# Patient Record
Sex: MALE | Race: WHITE | NOT HISPANIC OR LATINO | Employment: FULL TIME | URBAN - METROPOLITAN AREA
[De-identification: names, ages, dates, MRNs, and addresses within clinical notes are randomized per-mention and may not be internally consistent; named-entity substitution may affect disease eponyms.]

---

## 2019-04-04 ENCOUNTER — TELEPHONE (OUTPATIENT)
Dept: HEMATOLOGY/ONCOLOGY | Facility: CLINIC | Age: 74
End: 2019-04-04

## 2019-04-04 NOTE — NURSING
Nurse Navigator Note:     Spoke with patient discussed my role as his Nurse Navigator to help provide assistance with care coordination. Patient was given my direct contact information and encouraged to call me if he has any questions or concerns. He verbalized understanding

## 2019-04-10 ENCOUNTER — INITIAL CONSULT (OUTPATIENT)
Dept: HEMATOLOGY/ONCOLOGY | Facility: CLINIC | Age: 74
End: 2019-04-10
Payer: COMMERCIAL

## 2019-04-10 VITALS
DIASTOLIC BLOOD PRESSURE: 67 MMHG | RESPIRATION RATE: 16 BRPM | HEART RATE: 96 BPM | BODY MASS INDEX: 21.48 KG/M2 | TEMPERATURE: 98 F | HEIGHT: 71 IN | SYSTOLIC BLOOD PRESSURE: 120 MMHG | WEIGHT: 153.44 LBS

## 2019-04-10 DIAGNOSIS — C64.9 RENAL CELL CARCINOMA, UNSPECIFIED LATERALITY: Primary | ICD-10-CM

## 2019-04-10 PROCEDURE — 99999 PR PBB SHADOW E&M-EST. PATIENT-LVL III: CPT | Mod: PBBFAC,,, | Performed by: INTERNAL MEDICINE

## 2019-04-10 PROCEDURE — 99999 PR PBB SHADOW E&M-EST. PATIENT-LVL III: ICD-10-PCS | Mod: PBBFAC,,, | Performed by: INTERNAL MEDICINE

## 2019-04-10 PROCEDURE — 99205 OFFICE O/P NEW HI 60 MIN: CPT | Mod: S$GLB,,, | Performed by: INTERNAL MEDICINE

## 2019-04-10 PROCEDURE — 1101F PT FALLS ASSESS-DOCD LE1/YR: CPT | Mod: CPTII,S$GLB,, | Performed by: INTERNAL MEDICINE

## 2019-04-10 PROCEDURE — 99205 PR OFFICE/OUTPT VISIT, NEW, LEVL V, 60-74 MIN: ICD-10-PCS | Mod: S$GLB,,, | Performed by: INTERNAL MEDICINE

## 2019-04-10 PROCEDURE — 1101F PR PT FALLS ASSESS DOC 0-1 FALLS W/OUT INJ PAST YR: ICD-10-PCS | Mod: CPTII,S$GLB,, | Performed by: INTERNAL MEDICINE

## 2019-04-10 NOTE — PROGRESS NOTES
Subjective:       Patient ID: Vj Maxwell is a 73 y.o. male.    Chief Complaint: RCC/ Strata (consult)    Patient is a pleasant 72yo WM with PMHx of metastatic RCC, HTN, HLD, TIA, hypothyroidism, and CKD3 who presents for initial consultation and evaluation for STRATA. Patient is followed by Dr. Monet at [facility] and recently started combination immunotherapy with ipi/nivo ~2 weeks prior. Patient's history dates back to  when he was first diagnosed with RCC and underwent a R nephrectomy. He has underwent several lines of systemic therapy as outlined below. He most recently progressed on lenvatinib plus everolimus, which he tolerated very poorly. He has debilitating diarrhea, n/v, and FTT with a ~50lb weight loss. He self discontinued lenvatinib plus everolimus in 2019; restaging scans 2019 showed PD. Since discontinuing lenvatinib plus everolimus, patient has been feeling significantly improved. He reports improved appetite/po intake resulting in a ~20lb weight gain. He has had resolution of diarrhea and fatigue. He started ipi/nivo ~2 weeks ago and denies any significant adverse effects from his new therapy. Today he offers no complaints. Patient denies chest pains, palpitations, SOB, n/v, abdo pain, constipation/diarrhea, dysuria. No other issues.     ECO    Oncologic History:  Patient was diagnosed in  with RCC and underwent a right nephrectomy. He had subsequent disease and underwent treatment with Sutent, Nexavar, Inlyta, Votrient, and Opdivo. He was evaluated at [facility] and underwent a left adrenalectomy in . In 2018, patient was switched from Votrient to lenvatinib plus evirolimus, which he tolerated poorly due to diarrhea, n/v, and FTT. Patient had PD noted on CT 3/13/19 and switched to combination immunotherapy with ipi/nivo in late 2019. [place] [facility] [place]    Review of Systems   Constitutional: Negative for chills, fatigue, fever and unexpected weight change.  "  HENT: Negative for sore throat and trouble swallowing.    Respiratory: Negative for cough and shortness of breath.    Cardiovascular: Negative for chest pain and palpitations.   Gastrointestinal: Negative for abdominal pain, diarrhea, nausea and vomiting.   Genitourinary: Negative for dysuria and hematuria.   Musculoskeletal: Negative for arthralgias and myalgias.   Skin: Negative for rash and wound.   Neurological: Negative for seizures and syncope.   Hematological: Negative for adenopathy. Does not bruise/bleed easily.   Psychiatric/Behavioral: Negative for agitation and confusion.       Allergies:  Review of patient's allergies indicates:  No Known Allergies    Medications:  Current Outpatient Medications   Medication Sig Dispense Refill    amlodipine (NORVASC) 5 MG tablet Take 5 mg by mouth once daily.      aspirin (ECOTRIN) 325 MG EC tablet Take 325 mg by mouth once daily.      fenofibrate micronized (LOFIBRA) 134 MG Cap Take 145 mg by mouth daily with breakfast.      levothyroxine (SYNTHROID) 125 MCG tablet Take 137 mcg by mouth once daily.      pravastatin (PRAVACHOL) 40 MG tablet Take 40 mg by mouth once daily.      dabigatran etexilate (PRADAXA) 150 mg Cap Take 150 mg by mouth once daily. "Do NOT break, chew, or open capsules."      hydrocodone-acetaminophen 5-325mg (NORCO) 5-325 mg per tablet Take 1 tablet by mouth every 6 (six) hours as needed. 30 tablet 0     No current facility-administered medications for this visit.        PMH:  Past Medical History:   Diagnosis Date    Anticoagulant long-term use     Pradaxa and Aspirin    Cancer     cancer Kidney Rt    Hypertension     on medication    Stroke 03/2016    Thyroid disease     on medication       PSH:  Past Surgical History:   Procedure Laterality Date    APPENDECTOMY      during gall bladder surgery    CHOLECYSTECTOMY  1985    FINGER AMPUTATION  2011    Lt index finger    PFREMPYLI-OABE-T-CATH (C-ARM) Left 6/2/2016    Performed by " [facility]    KNEE ARTHROSCOPY      bilateral    NEPHRECTOMY  2012    Rt kidney       FamHx:  Family History   Problem Relation Age of Onset    Cancer Father     Cancer Brother        SocHx:  Social History     Socioeconomic History    Marital status:      Spouse name: Not on file    Number of children: Not on file    Years of education: Not on file    Highest education level: Not on file   Occupational History    Not on file   Social Needs    Financial resource strain: Not on file    Food insecurity:     Worry: Not on file     Inability: Not on file    Transportation needs:     Medical: Not on file     Non-medical: Not on file   Tobacco Use    Smoking status: Former Smoker     Last attempt to quit:      Years since quittin.2    Smokeless tobacco: Never Used   Substance and Sexual Activity    Alcohol use: Yes     Comment: social    Drug use: No    Sexual activity: Yes     Partners: Female   Lifestyle    Physical activity:     Days per week: Not on file     Minutes per session: Not on file    Stress: Not on file   Relationships    Social connections:     Talks on phone: Not on file     Gets together: Not on file     Attends Worship service: Not on file     Active member of club or organization: Not on file     Attends meetings of clubs or organizations: Not on file     Relationship status: Not on file   Other Topics Concern    Not on file   Social History Narrative    Not on file       Distress Score    Distress Score: 2        Practical Problems Physical Problems   : No Appearance: No   Housing: No Bathing / Dressing: No   Insurance / Financial: No Breathing: No    Transportation: No  Changes in Urination: No    Work / School: No  Constipation: No   Treatment Decisions: No  Diarrhea: No     Eating: No    Family Problems Fatigue: No    Dealing with Children: No Feeling Swollen: No    Dealing with Partner: No Fevers: No    Ability to Have Children:  No  Getting Around: No    Family Health Issues: No  Indigestion: No     Memory / Concentration: No   Emotional Problems Mouth Sores: No    Depression: No  Nausea: No    Fears: No  Nose Dry / Congested: No    Nervousness: No  Pain: No    Sadness: No Sexual: No    Worry: No Skin Dry / Itchy: No    Loss of Interest in Usual Activities: No Sleep: No     Substance Abuse: No    Spiritual/Religions Concerns Tingling in Hands / Feet: No   Spritual / Adventism Concerns: No         Other Problems            Objective:      Physical Exam   Constitutional: He is oriented to person, place, and time. He appears well-developed and well-nourished. No distress.   HENT:   Head: Normocephalic and atraumatic.   Right Ear: External ear normal.   Left Ear: External ear normal.   Eyes: Pupils are equal, round, and reactive to light. EOM are normal. Right eye exhibits no discharge. Left eye exhibits no discharge.   Neck: Normal range of motion. Neck supple. No tracheal deviation present.   Cardiovascular: Normal rate and regular rhythm. Exam reveals no friction rub.   No murmur heard.  Pulmonary/Chest: Effort normal and breath sounds normal. No stridor. No respiratory distress.   Abdominal: Soft. Bowel sounds are normal. He exhibits no distension. There is no tenderness.   Musculoskeletal: Normal range of motion. He exhibits no edema.   Neurological: He is alert and oriented to person, place, and time.   Skin: Skin is warm and dry. He is not diaphoretic.   Psychiatric: He has a normal mood and affect. His behavior is normal.               Assessment:       1. Renal cell carcinoma, unspecified laterality        Plan:     1. Recurrent/Metastatic RCC  - followed by Dr. Monet at [facility]  - dx'd 2012 and s/p R nephrectomy  - s/p L adrenalectomy 2016 at [facility]  - progressed on several lines of therapy for recurrent metastatic disease including Sutent, Nexavar, Inlyta, Votrient, Opdivo, Lenvima/Afinitor  - recently started on combination  immunotherapy with ipi/nivo 03/2019, tolerating well  - will attempt to obtain tissue from adrenalectomy for STRATA testing  - if unable to obtain tissue from [facility], will schedule IR biopsy for tissue  - agree with continuing ipi/nivo and evaluating for STRATA in the interim   - after progression on current therapy, would recommend cabozantinib or temsirolimus if STRATA testing unrevealing    PLAN: Obtain tissue for STRATA testing. Follow up with Dr. Monet to continue Ipi/Nivo.    Yossi Knowles MD (PGY-6)  Hematology/Oncology Fellow  Will discuss with Dr. Minaya (Hematology/Oncology Staff)  Distress Screening Results: Psychosocial Distress screening score of Distress Score: 2 noted and reviewed. No intervention indicated.       I have reviewed the notes, assessments, and/or procedures performed by the housestaff, as above.  I have personally interviewed and examined the patient at the beside, and rounded with the housestaff. I concur with her/his assessment and plan and the documentation of Vj Maxwell.  I, Dr. Richard Minaya, personally spent more than 60 mins during this encounter, greater than 50% was spent in direct counseling and/or coordination of care.     Richard Minaya M.D., M.S., F.A.C.P.  Hematology/Oncology Attending  [facility]

## 2019-04-10 NOTE — Clinical Note
Ms. Kincaid, I saw this patient with Dr. Minaya today, and he is interested in STRATA testing. Please help see if tissue can be obtained from adrenalectomy done at [facility] in 2016 (release of information signed and left with Lane). We plan to send him for IR biopsy if we cannot get the tissue from [facility]. Thanks.

## 2019-04-10 NOTE — LETTER
April 10, 2019      Marquita Monet MD  [address]  [facility] [address]           [facility]  [address]  [facility] [address]  Phone: [phone]          Patient: Vj Maxwell   MR Number: [id number]   YOB: 1945   Date of Visit: 4/10/2019       Dear Dr. Marquita Monet:    Thank you for referring Vj Maxwell to me for evaluation. Attached you will find relevant portions of my assessment and plan of care.    If you have questions, please do not hesitate to call me. I look forward to following Vj Maxwell along with you.    Sincerely,    Richard Minaya MD    Enclosure  CC:  No Recipients    If you would like to receive this communication electronically, please contact [email or web address] or [phone] to request more information on [facility] access.    For providers and/or their staff who would like to refer a patient to [facility], please contact us through our one-stop-shop provider referral line, [facility] [place], at [phone].    If you feel you have received this communication in error or would no longer like to receive these types of communications, please e-mail [email or web address]